# Patient Record
Sex: MALE | Race: WHITE | NOT HISPANIC OR LATINO | Employment: FULL TIME | URBAN - METROPOLITAN AREA
[De-identification: names, ages, dates, MRNs, and addresses within clinical notes are randomized per-mention and may not be internally consistent; named-entity substitution may affect disease eponyms.]

---

## 2017-04-04 ENCOUNTER — OFFICE VISIT - HEALTHEAST (OUTPATIENT)
Dept: FAMILY MEDICINE | Facility: CLINIC | Age: 42
End: 2017-04-04

## 2017-04-04 DIAGNOSIS — F31.9 BIPOLAR DISORDER (H): ICD-10-CM

## 2017-04-04 ASSESSMENT — MIFFLIN-ST. JEOR: SCORE: 1881.69

## 2017-04-05 ENCOUNTER — COMMUNICATION - HEALTHEAST (OUTPATIENT)
Dept: FAMILY MEDICINE | Facility: CLINIC | Age: 42
End: 2017-04-05

## 2018-02-12 ENCOUNTER — RECORDS - HEALTHEAST (OUTPATIENT)
Dept: ADMINISTRATIVE | Facility: OTHER | Age: 43
End: 2018-02-12

## 2018-02-12 ENCOUNTER — HOSPITAL ENCOUNTER (EMERGENCY)
Facility: CLINIC | Age: 43
Discharge: HOME | End: 2018-02-12
Attending: NURSE PRACTITIONER | Admitting: NURSE PRACTITIONER
Payer: COMMERCIAL

## 2018-02-12 ENCOUNTER — APPOINTMENT (OUTPATIENT)
Dept: GENERAL RADIOLOGY | Facility: CLINIC | Age: 43
End: 2018-02-12
Attending: NURSE PRACTITIONER
Payer: COMMERCIAL

## 2018-02-12 VITALS
SYSTOLIC BLOOD PRESSURE: 135 MMHG | TEMPERATURE: 98.4 F | WEIGHT: 180 LBS | HEART RATE: 42 BPM | RESPIRATION RATE: 18 BRPM | BODY MASS INDEX: 24.76 KG/M2 | OXYGEN SATURATION: 99 % | DIASTOLIC BLOOD PRESSURE: 84 MMHG

## 2018-02-12 DIAGNOSIS — S22.31XA CLOSED FRACTURE OF ONE RIB OF RIGHT SIDE, INITIAL ENCOUNTER: Primary | ICD-10-CM

## 2018-02-12 PROCEDURE — 99213 OFFICE O/P EST LOW 20 MIN: CPT | Performed by: NURSE PRACTITIONER

## 2018-02-12 PROCEDURE — G0463 HOSPITAL OUTPT CLINIC VISIT: HCPCS

## 2018-02-12 PROCEDURE — 71101 X-RAY EXAM UNILAT RIBS/CHEST: CPT | Mod: RT

## 2018-02-12 RX ORDER — HYDROCODONE BITARTRATE AND ACETAMINOPHEN 5; 325 MG/1; MG/1
1-2 TABLET ORAL EVERY 4 HOURS PRN
Qty: 15 TABLET | Refills: 0 | Status: SHIPPED | OUTPATIENT
Start: 2018-02-12 | End: 2020-01-03

## 2018-02-12 ASSESSMENT — ENCOUNTER SYMPTOMS
SINUS PAIN: 0
ACTIVITY CHANGE: 1
CONSTIPATION: 0
DIAPHORESIS: 0
HEADACHES: 0
SORE THROAT: 0
FEVER: 0
NAUSEA: 0
WHEEZING: 0
APPETITE CHANGE: 0
SHORTNESS OF BREATH: 0
WOUND: 1
ABDOMINAL PAIN: 0
EYE PAIN: 0
DIFFICULTY URINATING: 0
EYE REDNESS: 0
CHILLS: 0
SINUS PRESSURE: 0
VOMITING: 0
DYSURIA: 0
DIARRHEA: 0
FATIGUE: 0
COUGH: 1

## 2018-02-12 NOTE — ED PROVIDER NOTES
History     Chief Complaint   Patient presents with     Rib Injury     fell while skiing on friday. also hit head no LOC.      HPI  Law Guadalupe is a 42 year old male who fell downhill skiing this past Friday night.  Patient states he was skiing at 1200 and he is not certain of what he hit he thinks he just hit the snow and hit his right eye and right posterior mid to lower ribs extending over to his lateral side of his right side.  Patient states he may or may not have had a loss of consciousness he definitely was dazed.  He does not have any witnesses.  He states that after the event people stopped and asked him if he was okay.  Denies any chest pain, shortness of air, cough, or trouble breathing.  Patient states that he has been using 400 mg of ibuprofen every 4-6 hours and this seems to be helping manage his pain.  Patient describes the pain as stabbing and a 5-6 at rest and a 9-10 when coughing or breathing deeply.  Patient denies any previous rib fractures and denies any previous injuries to this area.  Patient denies any neurological changes including headache, dizziness, memory loss, speech difficulty, gait difficulty.    Problem List:    Patient Active Problem List    Diagnosis Date Noted     CARDIOVASCULAR SCREENING; LDL GOAL LESS THAN 160 06/26/2013     Priority: Medium        Past Medical History:    No past medical history on file.    Past Surgical History:    Past Surgical History:   Procedure Laterality Date     ARTHROSCOPY KNEE RT/LT  2000,2004,2005     RT and Lt knee     FRACTURE TX, ANKLE RT/LT  1999     RT ankle     HC REPAIR OF NASAL SEPTUM  2004     NECK SURGERY  2011    cervical vertebral fracture, C7       Family History:    Family History   Problem Relation Age of Onset     CANCER Mother      thyroid CA     HEART DISEASE Mother      factor 5     CANCER Paternal Grandfather      Cancer - colorectal No family hx of      Prostate Cancer No family hx of        Social History:  Marital Status:   Single [1]  Social History   Substance Use Topics     Smoking status: Never Smoker     Smokeless tobacco: Never Used     Alcohol use Yes      Comment: occ        Medications:      HYDROcodone-acetaminophen (NORCO) 5-325 MG per tablet   Naproxen Sodium (ALEVE PO)     Review of Systems   Constitutional: Positive for activity change. Negative for appetite change, chills, diaphoresis, fatigue and fever.   HENT: Negative for congestion, ear discharge, ear pain, sinus pain, sinus pressure, sneezing and sore throat.    Eyes: Negative for pain and redness.   Respiratory: Positive for cough (rare). Negative for shortness of breath and wheezing.    Cardiovascular: Negative for chest pain.   Gastrointestinal: Negative for abdominal pain, constipation, diarrhea, nausea and vomiting.   Genitourinary: Negative for difficulty urinating and dysuria.   Skin: Positive for wound (ribs posterior mid to lower). Negative for rash.   Neurological: Negative for headaches.   All other systems reviewed and are negative.      Physical Exam   BP: 135/84  Pulse: (!) 42  Temp: 98.4  F (36.9  C)  Resp: 18  Weight: 81.6 kg (180 lb)  SpO2: 99 %      Physical Exam   Constitutional: He appears well-developed and well-nourished. No distress.   HENT:   Head: Normocephalic and atraumatic.   Right Ear: External ear normal.   Left Ear: External ear normal.   Nose: Nose normal.   Mouth/Throat: Oropharynx is clear and moist. No oropharyngeal exudate.   Eyes: Conjunctivae and EOM are normal. Pupils are equal, round, and reactive to light. Right eye exhibits no discharge and no exudate. Left eye exhibits no discharge and no exudate.   Periorbital bruising,brown, yellow and green encompassing entire periorbital area without swelling   Neck: Neck supple. No JVD present. No tracheal deviation present.   Cardiovascular: Normal rate, regular rhythm and normal heart sounds.  Exam reveals no gallop and no friction rub.    No murmur heard.  Pulmonary/Chest: Effort  normal and breath sounds normal. No stridor. No respiratory distress. He has no wheezes. He has no rales. He exhibits no tenderness.   Musculoskeletal:        Arms:  Lymphadenopathy:     He has no cervical adenopathy.   Neurological: He is alert.   Skin: Skin is warm and dry. No rash noted. He is not diaphoretic.   Psychiatric: He has a normal mood and affect.   Nursing note and vitals reviewed.      ED Course     ED Course     Procedures    Labs Ordered and Resulted from Time of ED Arrival Up to the Time of Departure from the ED - No data to display  Results for orders placed or performed during the hospital encounter of 02/12/18   Ribs XR, unilat 3 views + PA chest, right    Narrative    RIBS AND CHEST RIGHT THREE VIEWS February 12, 2018 1:03 PM     HISTORY: Posterior mid to lower ribs - fell skiing Friday night.     COMPARISON: Chest 12/22/2015.    FINDINGS: Postsurgical change again seen at the right shoulder and  lower cervical spine. Old right clavicle fracture.      Impression    IMPRESSION: Minimal right pleural effusion. Mildly displaced fracture  of the right 10th rib posteriorly. No pneumothorax.    LAUREN WILKERSON MD       Assessments & Plan (with Medical Decision Making)     I have reviewed the nursing notes.    I have reviewed the findings, diagnosis, plan and need for follow up with the patient.  Law Guadalupe is a 42 year old male who fell downhill skiing this past Friday night.  Patient states he was skiing at 1200 and he is not certain of what he hit he thinks he just hit the snow and hit his right eye and right posterior mid to lower ribs extending over to his lateral side of his right side.  Patient states he may or may not have had a loss of consciousness he definitely was dazed.  He does not have any witnesses.  He states that after the event people stopped and asked him if he was okay.  Denies any chest pain, shortness of air, cough, or trouble breathing.  Patient states that he has been using 400  mg of ibuprofen every 4-6 hours and this seems to be helping manage his pain.  Patient describes the pain as stabbing and a 5-6 at rest and a 9-10 when coughing or breathing deeply.  Patient denies any previous rib fractures and denies any previous injuries to this area.  Patient denies any neurological changes including headache, dizziness, memory loss, speech difficulty, gait difficulty.  Exam as noted above.  Xray reveals rib fracture of 10th rib.  Discussed findings and care for rib fracture.  Patient okay with using ibuprofen 400 mg every 4-6 hours during the day and then will use Vicodin as needed as needed for sleep at night.  Recommend follow-up if ongoing pain    Discharge Medication List as of 2/12/2018  1:23 PM          Final diagnoses:   Closed fracture of one rib of right side, initial encounter - 10th rib       2/12/2018   [facility] EMERGENCY DEPARTMENT     Opal Junior, DIONY CNP  02/12/18 3382

## 2018-02-12 NOTE — DISCHARGE INSTRUCTIONS
Use ibuprofen 400-600 mg up to 4 times per day if needed for pain. Stop if it is causing nausea or abdominal pain.   Add Norco 5-325 (hydrocodone-acetaminophen) 1-2 pills up to every 4 hours if needed for pain. Do not use alcohol, operate machinery, drive, or climb on ladders for 8 hours after taking Norco. Use docusate (100mg) 2 times a day to prevent constipation while on narcotics.    Rib Fracture (Broken Rib)  Your ribs are curved bones in your chest. They help protect your lungs and expand and contract when you breathe. Children's ribs bend easily and can often withstand a blow or fall. But adult ribs are more likely to break (fracture) under stress. Even coughing or a hard sneeze can fracture a rib.    When to go to the Emergency Room (ER)  Although they can be painful, most rib fractures aren't serious. But they often make it hard to cough or breathe deeply. Get medical care right away if you have:    Trouble breathing.    Nausea, vomiting, or stomach pain with a sore or bruised rib.    Pain that worsens over time.    An injury to the chest or stomach.  What to expect in the ER  Here is what will happen in the ER:     A healthcare provider will ask about your injury and examine you carefully.    An X-ray of your chest will likely be taken to show any major damage to ribs and lungs. However, ribs can undergo small breaks that do not show up on X-rays, even though they still hurt.    You may be given medicine to ease your discomfort.    Rarely, rib fractures can cause a lung to collapse or lead to bleeding in the chest. In these cases, a tube will be inserted into the chest to reinflate the lung or drain the blood.  Follow-up  You are likely to heal in 6 to 8 weeks. Most rib fractures heal on their own with no lasting effects. Call your healthcare provider right away if you notice any of these symptoms:    Increased chest pain    Shortness of breath    Fever    Coughing up blood  Date Last Reviewed:  9/26/2015 2000-2017 [employer]. [address] [place] [address], [place] [address]. All rights reserved. This information is not intended as a substitute for professional medical care. Always follow your healthcare professional's instructions.

## 2018-02-12 NOTE — ED AVS SNAPSHOT
[facility] Emergency Department    [address] [facility] [address] [facility] [address]    Phone:  [phone]    Fax:  [phone]                                       Law Guadalupe   MRN: [id number]    Department:  [facility] Emergency Department   Date of Visit:  2/12/2018           After Visit Summary Signature Page     I have received my discharge instructions, and my questions have been answered. I have discussed any challenges I see with this plan with the nurse or doctor.    ..........................................................................................................................................  Patient/Patient Representative Signature      ..........................................................................................................................................  Patient Representative Print Name and Relationship to Patient    ..................................................               ................................................  Date                                            Time    ..........................................................................................................................................  Reviewed by Signature/Title    ...................................................              ..............................................  Date                                                            Time

## 2018-02-12 NOTE — ED AVS SNAPSHOT
[facility] Emergency Department    [address] [facility] [address]    Phone:  [phone]    Fax:  [phone]                                       Law Guadalupe   MRN: [id number]    Department:  [facility] Emergency Department   Date of Visit:  2/12/2018           Patient Information     Date Of Birth          1975        Your diagnoses for this visit were:     Closed fracture of one rib of right side, initial encounter 10th rib       You were seen by Opal Junior APRN CNP.      Follow-up Information     Follow up with Clinic, [facility].    Why:  As needed, If symptoms worsen    Contact information:    [address] [facility] [id number] [phone]          Discharge Instructions         Use ibuprofen 400-600 mg up to 4 times per day if needed for pain. Stop if it is causing nausea or abdominal pain.   Add Norco 5-325 (hydrocodone-acetaminophen) 1-2 pills up to every 4 hours if needed for pain. Do not use alcohol, operate machinery, drive, or climb on ladders for 8 hours after taking Norco. Use docusate (100mg) 2 times a day to prevent constipation while on narcotics.    Rib Fracture (Broken Rib)  Your ribs are curved bones in your chest. They help protect your lungs and expand and contract when you breathe. Children's ribs bend easily and can often withstand a blow or fall. But adult ribs are more likely to break (fracture) under stress. Even coughing or a hard sneeze can fracture a rib.    When to go to the Emergency Room (ER)  Although they can be painful, most rib fractures aren't serious. But they often make it hard to cough or breathe deeply. Get medical care right away if you have:    Trouble breathing.    Nausea, vomiting, or stomach pain with a sore or bruised rib.    Pain that worsens over time.    An injury to the chest or stomach.  What to expect in the ER  Here is what will happen in the ER:     A healthcare provider will ask about your injury and examine  you carefully.    An X-ray of your chest will likely be taken to show any major damage to ribs and lungs. However, ribs can undergo small breaks that do not show up on X-rays, even though they still hurt.    You may be given medicine to ease your discomfort.    Rarely, rib fractures can cause a lung to collapse or lead to bleeding in the chest. In these cases, a tube will be inserted into the chest to reinflate the lung or drain the blood.  Follow-up  You are likely to heal in 6 to 8 weeks. Most rib fractures heal on their own with no lasting effects. Call your healthcare provider right away if you notice any of these symptoms:    Increased chest pain    Shortness of breath    Fever    Coughing up blood  Date Last Reviewed: 9/26/2015 2000-2017 [employer]. [address] [place] [address], [place] [address]. All rights reserved. This information is not intended as a substitute for professional medical care. Always follow your healthcare professional's instructions.          24 Hour Appointment Hotline       To make an appointment at any [facility], call [phone] ([phone]). If you don't have a family doctor or clinic, we will help you find one. [facility] clinics are conveniently located to serve the needs of you and your family.             Review of your medicines      Our records show that you are taking the medicines listed below. If these are incorrect, please call your family doctor or clinic.        Dose / Directions Last dose taken    ALEVE PO   Dose:  220 mg        Take 220 mg by mouth 2 times daily (with meals)   Refills:  0        HYDROcodone-acetaminophen 5-325 MG per tablet   Commonly known as:  NORCO   Dose:  1-2 tablet   Quantity:  15 tablet        Take 1-2 tablets by mouth every 4 hours as needed for moderate to severe pain   Refills:  0                Prescriptions were sent or printed at these locations (1 Prescription)                   Other Prescriptions                 Printed at Department/Unit printer (1 of 1)         HYDROcodone-acetaminophen (NORCO) 5-325 MG per tablet                Procedures and tests performed during your visit     Ribs XR, unilat 3 views + PA chest, right      Orders Needing Specimen Collection     None      Pending Results     Date and Time Order Name Status Description    2/12/2018 1243 Ribs XR, unilat 3 views + PA chest, right Preliminary             Pending Culture Results     No orders found from 2/10/2018 to 2/13/2018.            Pending Results Instructions     If you had any lab results that were not finalized at the time of your Discharge, you can call the ED Lab Result RN at [phone]. You will be contacted by this team for any positive Lab results or changes in treatment. The nurses are available 7 days a week from 10A to 6:30P.  You can leave a message 24 hours per day and they will return your call.        Test Results From Your Hospital Stay        2/12/2018  1:11 PM      Narrative     RIBS AND CHEST RIGHT THREE VIEWS February 12, 2018 1:03 PM     HISTORY: Posterior mid to lower ribs - fell skiing Friday night.     COMPARISON: Chest 12/22/2015.    FINDINGS: Postsurgical change again seen at the right shoulder and  lower cervical spine. Old right clavicle fracture.        Impression     IMPRESSION: Minimal right pleural effusion. Mildly displaced fracture  of the right 10th rib posteriorly. No pneumothorax.                Thank you for choosing [facility]       Thank you for choosing [facility] for your care. Our goal is always to provide you with excellent care. Hearing back from our patients is one way we can continue to improve our services. Please take a few minutes to complete the written survey that you may receive in the mail after you visit with us. Thank you!        [facility] Information     [employer] [facility] lets you send messages to your doctor, view your test results, renew your prescriptions, schedule appointments and more. To sign up, go to  [email or web address] . Click on \"Log in\" on the left side of the screen, which will take you to the Welcome page. Then click on \"Sign up Now\" on the right side of the page.     You will be asked to enter the access code listed below, as well as some personal information. Please follow the directions to create your username and password.     Your access code is: [email or web address]  Expires: 2018  1:23 PM     Your access code will  in 90 days. If you need help or a new code, please call your [place] clinic or [phone].        Care EveryWhere ID     This is your Care EveryWhere ID. This could be used by other organizations to access your [place] medical records  [id number]        Equal Access to Services     SUKHJINDER GONZALEZ : Felicia Yepez, mari obrien, heike roach, raudel rice. So [facility] [phone].    ATENCIÓN: Si habla español, tiene a wagner disposición servicios gratuitos de asistencia lingüística. Llame al [phone].    We comply with applicable federal civil rights laws and [place] laws. We do not discriminate on the basis of race, color, national origin, age, disability, sex, sexual orientation, or gender identity.            After Visit Summary       This is your record. Keep this with you and show to your community pharmacist(s) and doctor(s) at your next visit.                  "

## 2018-04-06 ENCOUNTER — COMMUNICATION - HEALTHEAST (OUTPATIENT)
Dept: FAMILY MEDICINE | Facility: CLINIC | Age: 43
End: 2018-04-06

## 2018-04-09 ENCOUNTER — OFFICE VISIT - HEALTHEAST (OUTPATIENT)
Dept: FAMILY MEDICINE | Facility: CLINIC | Age: 43
End: 2018-04-09

## 2018-04-09 DIAGNOSIS — R22.0 SCALP MASS: ICD-10-CM

## 2018-04-09 DIAGNOSIS — Z01.818 PREOPERATIVE EXAMINATION: ICD-10-CM

## 2018-04-09 DIAGNOSIS — M51.26 LUMBAR DISC HERNIATION: ICD-10-CM

## 2018-04-09 LAB — HGB BLD-MCNC: 16.1 G/DL (ref 14–18)

## 2018-04-09 ASSESSMENT — MIFFLIN-ST. JEOR: SCORE: 1899.83

## 2018-04-10 ENCOUNTER — OFFICE VISIT - HEALTHEAST (OUTPATIENT)
Dept: SURGERY | Facility: CLINIC | Age: 43
End: 2018-04-10

## 2018-04-10 DIAGNOSIS — D17.0 LIPOMA OF SCALP: ICD-10-CM

## 2018-04-10 ASSESSMENT — MIFFLIN-ST. JEOR: SCORE: 1899.83

## 2018-04-13 ENCOUNTER — ANESTHESIA - HEALTHEAST (OUTPATIENT)
Dept: SURGERY | Facility: AMBULATORY SURGERY CENTER | Age: 43
End: 2018-04-13

## 2018-04-13 ASSESSMENT — MIFFLIN-ST. JEOR: SCORE: 1907.77

## 2018-04-16 ENCOUNTER — SURGERY - HEALTHEAST (OUTPATIENT)
Dept: SURGERY | Facility: AMBULATORY SURGERY CENTER | Age: 43
End: 2018-04-16

## 2018-04-19 ENCOUNTER — COMMUNICATION - HEALTHEAST (OUTPATIENT)
Dept: SURGERY | Facility: CLINIC | Age: 43
End: 2018-04-19

## 2019-05-03 ENCOUNTER — COMMUNICATION - HEALTHEAST (OUTPATIENT)
Dept: FAMILY MEDICINE | Facility: CLINIC | Age: 44
End: 2019-05-03

## 2019-05-03 ENCOUNTER — OFFICE VISIT - HEALTHEAST (OUTPATIENT)
Dept: FAMILY MEDICINE | Facility: CLINIC | Age: 44
End: 2019-05-03

## 2019-05-03 DIAGNOSIS — Z23 IMMUNIZATION DUE: ICD-10-CM

## 2019-05-03 DIAGNOSIS — S83.207A TEAR OF MENISCUS OF LEFT KNEE AS CURRENT INJURY, UNSPECIFIED MENISCUS, UNSPECIFIED TEAR TYPE, INITIAL ENCOUNTER: ICD-10-CM

## 2019-05-03 DIAGNOSIS — Z01.818 ENCOUNTER FOR PREOPERATIVE EXAMINATION FOR GENERAL SURGICAL PROCEDURE: ICD-10-CM

## 2019-05-03 DIAGNOSIS — S83.512A RUPTURE OF ANTERIOR CRUCIATE LIGAMENT OF LEFT KNEE, INITIAL ENCOUNTER: ICD-10-CM

## 2019-05-03 LAB — HGB BLD-MCNC: 16.2 G/DL (ref 14–18)

## 2019-05-03 ASSESSMENT — MIFFLIN-ST. JEOR: SCORE: 1868.36

## 2020-01-03 ENCOUNTER — RECORDS - HEALTHEAST (OUTPATIENT)
Dept: ADMINISTRATIVE | Facility: OTHER | Age: 45
End: 2020-01-03

## 2020-01-03 ENCOUNTER — HOSPITAL ENCOUNTER (EMERGENCY)
Facility: CLINIC | Age: 45
Discharge: HOME | End: 2020-01-03
Attending: PHYSICIAN ASSISTANT | Admitting: PHYSICIAN ASSISTANT
Payer: COMMERCIAL

## 2020-01-03 VITALS
WEIGHT: 200 LBS | DIASTOLIC BLOOD PRESSURE: 88 MMHG | TEMPERATURE: 98 F | BODY MASS INDEX: 27.09 KG/M2 | OXYGEN SATURATION: 99 % | HEIGHT: 72 IN | SYSTOLIC BLOOD PRESSURE: 127 MMHG | HEART RATE: 66 BPM

## 2020-01-03 DIAGNOSIS — Z20.828 EXPOSURE TO INFLUENZA: ICD-10-CM

## 2020-01-03 DIAGNOSIS — J11.1 INFLUENZA-LIKE ILLNESS: ICD-10-CM

## 2020-01-03 LAB
FLUAV AG UPPER RESP QL IA.RAPID: NEGATIVE
FLUBV AG UPPER RESP QL IA.RAPID: NEGATIVE
INTERNAL QC OK POCT: YES
INTERNAL QC OK POCT: YES
S PYO AG THROAT QL IA.RAPID: NEGATIVE

## 2020-01-03 PROCEDURE — 87880 STREP A ASSAY W/OPTIC: CPT | Performed by: PHYSICIAN ASSISTANT

## 2020-01-03 PROCEDURE — 87804 INFLUENZA ASSAY W/OPTIC: CPT | Performed by: PHYSICIAN ASSISTANT

## 2020-01-03 PROCEDURE — 99214 OFFICE O/P EST MOD 30 MIN: CPT | Mod: Z6 | Performed by: PHYSICIAN ASSISTANT

## 2020-01-03 PROCEDURE — 87081 CULTURE SCREEN ONLY: CPT | Performed by: PHYSICIAN ASSISTANT

## 2020-01-03 PROCEDURE — G0463 HOSPITAL OUTPT CLINIC VISIT: HCPCS | Performed by: PHYSICIAN ASSISTANT

## 2020-01-03 RX ORDER — OSELTAMIVIR PHOSPHATE 75 MG/1
75 CAPSULE ORAL 2 TIMES DAILY
Qty: 10 CAPSULE | Refills: 0 | Status: SHIPPED | OUTPATIENT
Start: 2020-01-03 | End: 2020-01-08

## 2020-01-03 ASSESSMENT — ENCOUNTER SYMPTOMS
HEADACHES: 1
CHILLS: 1
ARTHRALGIAS: 1
COUGH: 1
SORE THROAT: 1
FEVER: 1

## 2020-01-03 ASSESSMENT — MIFFLIN-ST. JEOR: SCORE: 1840.19

## 2020-01-03 NOTE — ED AVS SNAPSHOT
[facility] Emergency Department  [address] [facility] [address]  Phone:  [phone]  Fax:  [phone]                                    Law Guadalupe   MRN: [id number]    Department:  [facility] Emergency Department   Date of Visit:  1/3/2020           After Visit Summary Signature Page    I have received my discharge instructions, and my questions have been answered. I have discussed any challenges I see with this plan with the nurse or doctor.    ..........................................................................................................................................  Patient/Patient Representative Signature      ..........................................................................................................................................  Patient Representative Print Name and Relationship to Patient    ..................................................               ................................................  Date                                   Time    ..........................................................................................................................................  Reviewed by Signature/Title    ...................................................              ..............................................  Date                                               Time          22EPIC Rev 08/18

## 2020-01-03 NOTE — ED PROVIDER NOTES
History     Chief Complaint   Patient presents with     Cough     HPI  Law Guadalupe is a 44 year old male who presents with complaints of sudden onset of subjective fevers, headache, body aches, congestion, sore throat, and cough for the past 2 days.  Patient is concerned as he has been exposed to influenza from his father and mother.  Denies rash, nausea, vomiting, abdominal pain, chest pain, or shortness of breath.  Patient denies history of asthma or underlying lung disease.      Allergies:  Allergies   Allergen Reactions     Bees        Problem List:    Patient Active Problem List    Diagnosis Date Noted     CARDIOVASCULAR SCREENING; LDL GOAL LESS THAN 160 06/26/2013     Priority: Medium        Past Medical History:    History reviewed. No pertinent past medical history.    Past Surgical History:    Past Surgical History:   Procedure Laterality Date     ARTHROSCOPY KNEE RT/LT  2000,2004,2005     RT and Lt knee     FRACTURE TX, ANKLE RT/LT  1999     RT ankle     HC REPAIR OF NASAL SEPTUM  2004     NECK SURGERY  2011    cervical vertebral fracture, C7       Family History:    Family History   Problem Relation Age of Onset     Cancer Mother         thyroid CA     Heart Disease Mother         factor 5     Cancer Paternal Grandfather      Cancer - colorectal No family hx of      Prostate Cancer No family hx of        Social History:  Marital Status:  Single [1]  Social History     Tobacco Use     Smoking status: Never Smoker     Smokeless tobacco: Never Used   Substance Use Topics     Alcohol use: Yes     Comment: occ     Drug use: No        Medications:    oseltamivir (TAMIFLU) 75 MG capsule  Naproxen Sodium (ALEVE PO)          Review of Systems   Constitutional: Positive for chills and fever.   HENT: Positive for congestion and sore throat.    Respiratory: Positive for cough.    Musculoskeletal: Positive for arthralgias.   Skin: Negative.    Neurological: Positive for headaches.   All other systems reviewed and  are negative.      Physical Exam   BP: 127/88  Pulse: 66  Temp: 98  F (36.7  C)  Height: 182.9 cm (6')  Weight: 90.7 kg (200 lb)  SpO2: 99 %      Physical Exam  Constitutional:       General: He is not in acute distress.     Appearance: He is well-developed. He is not ill-appearing, toxic-appearing or diaphoretic.   HENT:      Head: Normocephalic and atraumatic.      Right Ear: Tympanic membrane, ear canal and external ear normal.      Left Ear: Tympanic membrane, ear canal and external ear normal.      Nose: Mucosal edema, congestion and rhinorrhea present.      Mouth/Throat:      Lips: Pink.      Mouth: Mucous membranes are moist.      Pharynx: Uvula midline. Posterior oropharyngeal erythema present. No pharyngeal swelling, oropharyngeal exudate or uvula swelling.      Tonsils: No tonsillar exudate or tonsillar abscesses.   Eyes:      Conjunctiva/sclera: Conjunctivae normal.      Pupils: Pupils are equal, round, and reactive to light.   Neck:      Musculoskeletal: Full passive range of motion without pain, normal range of motion and neck supple. Normal range of motion. No neck rigidity.   Cardiovascular:      Rate and Rhythm: Normal rate and regular rhythm.      Heart sounds: Normal heart sounds.   Pulmonary:      Effort: Pulmonary effort is normal. No respiratory distress.      Breath sounds: Normal breath sounds and air entry. No stridor, decreased air movement or transmitted upper airway sounds. No decreased breath sounds, wheezing, rhonchi or rales.   Lymphadenopathy:      Cervical: No cervical adenopathy.   Skin:     General: Skin is warm and dry.   Neurological:      Mental Status: He is alert and oriented to person, place, and time.         ED Course        Procedures    Results for orders placed or performed during the hospital encounter of 01/03/20 (from the past 24 hour(s))   Influenza A/B antigen POCT   Result Value Ref Range    Influenza A Negative neg    Influenza B Negative neg    Internal QC OK Yes     Rapid strep group A screen POCT   Result Value Ref Range    Rapid Strep A Screen Negative neg    Internal QC OK Yes    Beta strep group A culture   Result Value Ref Range    Specimen Description Throat     Special Requests Specimen collected in eSwab transport (white cap)     Culture Micro PENDING        Medications - No data to display    Assessments & Plan (with Medical Decision Making)     Pt is a 44 year old male who presents with complaints of sudden onset of subjective fevers, headache, body aches, congestion, sore throat, and cough for the past 2 days.  Patient is concerned as he has been exposed to influenza from his father and mother.  Pt is afebrile on arrival.  Exam as above.  Rapid strep was negative.  Culture is pending.  Influenza was negative.  Despite patient's negative influenza testing here, I suspect patient likely has influenza.  Influenza is prevalent in the community.  Discussed treatment options with patient.  Patient is otherwise healthy.  We discussed risks versus benefits of treating with Tamiflu.  He is presenting within the recommended window for treatment and had a close exposure at home.  Patient prefers to treat with Tamiflu.  Encouraged additional symptomatic treatments at home.  Return precautions were reviewed.  Hand-outs were provided.    Patient was sent with Tamiflu and was instructed to follow-up with PCP if no improvement in 5-7 days for continued care and management or sooner if new or worsening symptoms.  He is to return to the ED for persistent and/or worsening symptoms.  Patient expressed understanding of the diagnosis and plan and was discharged home in good condition.    I have reviewed the nursing notes.    I have reviewed the findings, diagnosis, plan and need for follow up with the patient.    Discharge Medication List as of 1/3/2020  2:04 PM      START taking these medications    Details   oseltamivir (TAMIFLU) 75 MG capsule Take 1 capsule (75 mg) by mouth 2 times  daily for 5 days, Disp-10 capsule, R-0, E-Prescribe             Final diagnoses:   Influenza-like illness   Exposure to influenza       1/3/2020   [facility] EMERGENCY DEPARTMENT      Disclaimer:  This note consists of symbols derived from keyboarding, dictation and/or voice recognition software.  As a result, there may be errors in the script that have gone undetected.  Please consider this when interpreting information found in this chart.     Anais Lucero PA-C  01/03/20 2055

## 2020-01-05 LAB
BACTERIA SPEC CULT: NORMAL
Lab: NORMAL
SPECIMEN SOURCE: NORMAL

## 2020-06-30 ENCOUNTER — APPOINTMENT (OUTPATIENT)
Dept: GENERAL RADIOLOGY | Facility: CLINIC | Age: 45
End: 2020-06-30
Attending: PHYSICIAN ASSISTANT
Payer: COMMERCIAL

## 2020-06-30 ENCOUNTER — HOSPITAL ENCOUNTER (EMERGENCY)
Facility: CLINIC | Age: 45
Discharge: HOME | End: 2020-06-30
Attending: PHYSICIAN ASSISTANT | Admitting: PHYSICIAN ASSISTANT
Payer: COMMERCIAL

## 2020-06-30 ENCOUNTER — RECORDS - HEALTHEAST (OUTPATIENT)
Dept: ADMINISTRATIVE | Facility: OTHER | Age: 45
End: 2020-06-30

## 2020-06-30 VITALS
DIASTOLIC BLOOD PRESSURE: 83 MMHG | SYSTOLIC BLOOD PRESSURE: 128 MMHG | HEART RATE: 50 BPM | OXYGEN SATURATION: 98 % | TEMPERATURE: 98.8 F | WEIGHT: 190 LBS | RESPIRATION RATE: 18 BRPM | BODY MASS INDEX: 25.77 KG/M2

## 2020-06-30 DIAGNOSIS — R06.2 WHEEZING: ICD-10-CM

## 2020-06-30 DIAGNOSIS — R06.02 SHORTNESS OF BREATH: ICD-10-CM

## 2020-06-30 DIAGNOSIS — R05.9 COUGH: ICD-10-CM

## 2020-06-30 LAB
ALBUMIN SERPL-MCNC: 3.7 G/DL (ref 3.4–5)
ALP SERPL-CCNC: 66 U/L (ref 40–150)
ALT SERPL W P-5'-P-CCNC: 29 U/L (ref 0–70)
ANION GAP SERPL CALCULATED.3IONS-SCNC: 6 MMOL/L (ref 3–14)
AST SERPL W P-5'-P-CCNC: 29 U/L (ref 0–45)
BASE EXCESS BLDV CALC-SCNC: 1.3 MMOL/L
BASOPHILS # BLD AUTO: 0 10E9/L (ref 0–0.2)
BASOPHILS NFR BLD AUTO: 0.4 %
BILIRUB SERPL-MCNC: 1 MG/DL (ref 0.2–1.3)
BUN SERPL-MCNC: 21 MG/DL (ref 7–30)
CALCIUM SERPL-MCNC: 9 MG/DL (ref 8.5–10.1)
CHLORIDE SERPL-SCNC: 106 MMOL/L (ref 94–109)
CO2 SERPL-SCNC: 27 MMOL/L (ref 20–32)
CREAT SERPL-MCNC: 0.84 MG/DL (ref 0.66–1.25)
D DIMER PPP FEU-MCNC: <0.3 UG/ML FEU (ref 0–0.5)
DIFFERENTIAL METHOD BLD: NORMAL
EOSINOPHIL # BLD AUTO: 0.1 10E9/L (ref 0–0.7)
EOSINOPHIL NFR BLD AUTO: 1.5 %
ERYTHROCYTE [DISTWIDTH] IN BLOOD BY AUTOMATED COUNT: 11.5 % (ref 10–15)
GFR SERPL CREATININE-BSD FRML MDRD: >90 ML/MIN/{1.73_M2}
GLUCOSE SERPL-MCNC: 85 MG/DL (ref 70–99)
HCO3 BLDV-SCNC: 27 MMOL/L (ref 21–28)
HCT VFR BLD AUTO: 43.8 % (ref 40–53)
HGB BLD-MCNC: 14.7 G/DL (ref 13.3–17.7)
IMM GRANULOCYTES # BLD: 0 10E9/L (ref 0–0.4)
IMM GRANULOCYTES NFR BLD: 0.4 %
LYMPHOCYTES # BLD AUTO: 1.3 10E9/L (ref 0.8–5.3)
LYMPHOCYTES NFR BLD AUTO: 24.1 %
MCH RBC QN AUTO: 30.7 PG (ref 26.5–33)
MCHC RBC AUTO-ENTMCNC: 33.6 G/DL (ref 31.5–36.5)
MCV RBC AUTO: 91 FL (ref 78–100)
MONOCYTES # BLD AUTO: 0.5 10E9/L (ref 0–1.3)
MONOCYTES NFR BLD AUTO: 9.1 %
NEUTROPHILS # BLD AUTO: 3.4 10E9/L (ref 1.6–8.3)
NEUTROPHILS NFR BLD AUTO: 64.5 %
NRBC # BLD AUTO: 0 10*3/UL
NRBC BLD AUTO-RTO: 0 /100
O2/TOTAL GAS SETTING VFR VENT: NORMAL %
PCO2 BLDV: 47 MM HG (ref 40–50)
PH BLDV: 7.37 PH (ref 7.32–7.43)
PLATELET # BLD AUTO: 224 10E9/L (ref 150–450)
PO2 BLDV: 31 MM HG (ref 25–47)
POTASSIUM SERPL-SCNC: 4.6 MMOL/L (ref 3.4–5.3)
PROT SERPL-MCNC: 7.2 G/DL (ref 6.8–8.8)
RBC # BLD AUTO: 4.79 10E12/L (ref 4.4–5.9)
SODIUM SERPL-SCNC: 139 MMOL/L (ref 133–144)
TROPONIN I SERPL-MCNC: <0.015 UG/L (ref 0–0.04)
WBC # BLD AUTO: 5.3 10E9/L (ref 4–11)

## 2020-06-30 PROCEDURE — 84484 ASSAY OF TROPONIN QUANT: CPT | Performed by: PHYSICIAN ASSISTANT

## 2020-06-30 PROCEDURE — 93005 ELECTROCARDIOGRAM TRACING: CPT | Performed by: PHYSICIAN ASSISTANT

## 2020-06-30 PROCEDURE — 99285 EMERGENCY DEPT VISIT HI MDM: CPT | Mod: 25 | Performed by: PHYSICIAN ASSISTANT

## 2020-06-30 PROCEDURE — 71045 X-RAY EXAM CHEST 1 VIEW: CPT

## 2020-06-30 PROCEDURE — U0003 INFECTIOUS AGENT DETECTION BY NUCLEIC ACID (DNA OR RNA); SEVERE ACUTE RESPIRATORY SYNDROME CORONAVIRUS 2 (SARS-COV-2) (CORONAVIRUS DISEASE [COVID-19]), AMPLIFIED PROBE TECHNIQUE, MAKING USE OF HIGH THROUGHPUT TECHNOLOGIES AS DESCRIBED BY CMS-2020-01-R: HCPCS | Performed by: PHYSICIAN ASSISTANT

## 2020-06-30 PROCEDURE — 82803 BLOOD GASES ANY COMBINATION: CPT | Performed by: PHYSICIAN ASSISTANT

## 2020-06-30 PROCEDURE — 80053 COMPREHEN METABOLIC PANEL: CPT | Performed by: PHYSICIAN ASSISTANT

## 2020-06-30 PROCEDURE — 85379 FIBRIN DEGRADATION QUANT: CPT | Performed by: PHYSICIAN ASSISTANT

## 2020-06-30 PROCEDURE — C9803 HOPD COVID-19 SPEC COLLECT: HCPCS | Performed by: PHYSICIAN ASSISTANT

## 2020-06-30 PROCEDURE — 85025 COMPLETE CBC W/AUTO DIFF WBC: CPT | Performed by: PHYSICIAN ASSISTANT

## 2020-06-30 PROCEDURE — 93010 ELECTROCARDIOGRAM REPORT: CPT | Mod: Z6 | Performed by: PHYSICIAN ASSISTANT

## 2020-06-30 RX ORDER — ALBUTEROL SULFATE 90 UG/1
2 AEROSOL, METERED RESPIRATORY (INHALATION) EVERY 4 HOURS PRN
Qty: 1 INHALER | Refills: 0 | Status: SHIPPED | OUTPATIENT
Start: 2020-06-30

## 2020-06-30 ASSESSMENT — ENCOUNTER SYMPTOMS
ARTHRALGIAS: 1
WHEEZING: 1
SHORTNESS OF BREATH: 1
MYALGIAS: 1
CARDIOVASCULAR NEGATIVE: 1
COUGH: 1
FEVER: 0
HEADACHES: 1
CHEST TIGHTNESS: 0
FATIGUE: 1

## 2020-06-30 NOTE — ED NOTES
"Pt was mowing lawn on Saturday and was in green mold for approximately 10 minutes.   Pt felt short of breath and fatigue 8-10 hours after.  Pt woke up the next day with pounding headache, sob, sore and stiffness.   Pt tried to exercise but was feeling like \"passing out\" and slept most of the day.   Pt laid around all day yesterday.   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

## 2020-06-30 NOTE — ED PROVIDER NOTES
"  History     Chief Complaint   Patient presents with     Toxic Inhalation     inhaled green mold dust on saturday, sunday felt lightheaded, SOA today while at work, cough     HPI  Law Guadalupe is a 44 year old male who presents with complaints of shortness of breath, fatigue, arthralgias and myalgias, headache, and cough for the past 3 days.  Patient states he was mowing the lawn this past weekend and drove his lawnmower through a bunch of grass and \"green mold.\"  He states he was breathing in the substance.  Patient states that the following day, he woke up feeling ill with a headache, shortness of breath, wheezing, cough, and generalized stiffness and soreness.  Pt is concerned that breathing in the mold could have triggered inflammation in his lungs.  That afternoon, he took a 5-hour nap and felt somewhat better when he woke up.  He states when he went to work today, he again developed shortness of breath and lightheadedness and felt like he was going to pass out.  Patient denies any medical problems.  He states he is very active and healthy.  He denies history of asthma or smoking history.  Patient denies any ill contacts and specifically denies any contact with anyone testing positive for COVID-19.  Patient denies chest pain at any point.  Denies fevers, chills, nausea, vomiting, diarrhea, abdominal pain, or leg pain/swelling.      Allergies:  Allergies   Allergen Reactions     Bees        Problem List:    Patient Active Problem List    Diagnosis Date Noted     CARDIOVASCULAR SCREENING; LDL GOAL LESS THAN 160 06/26/2013     Priority: Medium        Past Medical History:    No past medical history on file.    Past Surgical History:    Past Surgical History:   Procedure Laterality Date     ARTHROSCOPY KNEE RT/LT  2000,2004,2005     RT and Lt knee     FRACTURE TX, ANKLE RT/LT  1999     RT ankle     HC REPAIR OF NASAL SEPTUM  2004     NECK SURGERY  2011    cervical vertebral fracture, C7       Family History:  "   Family History   Problem Relation Age of Onset     Cancer Mother         thyroid CA     Heart Disease Mother         factor 5     Cancer Paternal Grandfather      Cancer - colorectal No family hx of      Prostate Cancer No family hx of        Social History:  Marital Status:  Single [1]  Social History     Tobacco Use     Smoking status: Never Smoker     Smokeless tobacco: Never Used   Substance Use Topics     Alcohol use: Yes     Comment: occ     Drug use: No        Medications:    albuterol (PROAIR HFA/PROVENTIL HFA/VENTOLIN HFA) 108 (90 Base) MCG/ACT inhaler  Naproxen Sodium (ALEVE PO)          Review of Systems   Constitutional: Positive for fatigue. Negative for fever.   Respiratory: Positive for cough, shortness of breath and wheezing. Negative for chest tightness.    Cardiovascular: Negative.  Negative for chest pain.   Musculoskeletal: Positive for arthralgias and myalgias.   Skin: Negative.    Neurological: Positive for headaches.   All other systems reviewed and are negative.      Physical Exam   BP: 114/68  Pulse: 51  Heart Rate: 58  Temp: 98.4  F (36.9  C)  Resp: 16  Weight: 86.2 kg (190 lb)  SpO2: 98 %      Physical Exam  Constitutional:       General: He is not in acute distress.     Appearance: He is well-developed. He is not ill-appearing, toxic-appearing or diaphoretic.   HENT:      Head: Normocephalic and atraumatic.      Nose: Nose normal. No congestion or rhinorrhea.      Mouth/Throat:      Lips: Pink.      Mouth: Mucous membranes are moist.      Pharynx: Oropharynx is clear. No oropharyngeal exudate or posterior oropharyngeal erythema.   Eyes:      Conjunctiva/sclera: Conjunctivae normal.      Pupils: Pupils are equal, round, and reactive to light.   Neck:      Musculoskeletal: Normal range of motion and neck supple. No neck rigidity.   Cardiovascular:      Rate and Rhythm: Normal rate and regular rhythm.      Heart sounds: Normal heart sounds.   Pulmonary:      Effort: Pulmonary effort is  normal. No respiratory distress.      Breath sounds: Normal breath sounds. No stridor. No wheezing, rhonchi or rales.   Abdominal:      Palpations: Abdomen is soft.      Tenderness: There is no abdominal tenderness.   Musculoskeletal: Normal range of motion.   Lymphadenopathy:      Cervical: No cervical adenopathy.   Skin:     General: Skin is warm and dry.   Neurological:      Mental Status: He is alert and oriented to person, place, and time.         ED Course        Procedures               EKG Interpretation:      Interpreted by Anais Lucero PA-C and Dr. Menchaca  Symptoms at time of EKG: None   Rhythm: Sinus bradycardia  Rate: Bradycardia (52)  Axis: Normal  Ectopy: None  Conduction: Right bundle branch block  ST Segments/ T Waves: Non-specific ST-T wave changes  Q Waves: None  Comparison to prior: No old EKG available    Clinical Impression: Right bundle branch block      Results for orders placed or performed during the hospital encounter of 06/30/20 (from the past 24 hour(s))   CBC with platelets differential   Result Value Ref Range    WBC 5.3 4.0 - 11.0 10e9/L    RBC Count 4.79 4.4 - 5.9 10e12/L    Hemoglobin 14.7 13.3 - 17.7 g/dL    Hematocrit 43.8 40.0 - 53.0 %    MCV 91 78 - 100 fl    MCH 30.7 26.5 - 33.0 pg    MCHC 33.6 31.5 - 36.5 g/dL    RDW 11.5 10.0 - 15.0 %    Platelet Count 224 150 - 450 10e9/L    Diff Method Automated Method     % Neutrophils 64.5 %    % Lymphocytes 24.1 %    % Monocytes 9.1 %    % Eosinophils 1.5 %    % Basophils 0.4 %    % Immature Granulocytes 0.4 %    Nucleated RBCs 0 0 /100    Absolute Neutrophil 3.4 1.6 - 8.3 10e9/L    Absolute Lymphocytes 1.3 0.8 - 5.3 10e9/L    Absolute Monocytes 0.5 0.0 - 1.3 10e9/L    Absolute Eosinophils 0.1 0.0 - 0.7 10e9/L    Absolute Basophils 0.0 0.0 - 0.2 10e9/L    Abs Immature Granulocytes 0.0 0 - 0.4 10e9/L    Absolute Nucleated RBC 0.0    Comprehensive metabolic panel   Result Value Ref Range    Sodium 139 133 - 144 mmol/L    Potassium 4.6  "3.4 - 5.3 mmol/L    Chloride 106 94 - 109 mmol/L    Carbon Dioxide 27 20 - 32 mmol/L    Anion Gap 6 3 - 14 mmol/L    Glucose 85 70 - 99 mg/dL    Urea Nitrogen 21 7 - 30 mg/dL    Creatinine 0.84 0.66 - 1.25 mg/dL    GFR Estimate >90 >60 mL/min/[1.73_m2]    GFR Estimate If Black >90 >60 mL/min/[1.73_m2]    Calcium 9.0 8.5 - 10.1 mg/dL    Bilirubin Total 1.0 0.2 - 1.3 mg/dL    Albumin 3.7 3.4 - 5.0 g/dL    Protein Total 7.2 6.8 - 8.8 g/dL    Alkaline Phosphatase 66 40 - 150 U/L    ALT 29 0 - 70 U/L    AST 29 0 - 45 U/L   Blood gas venous   Result Value Ref Range    Ph Venous 7.37 7.32 - 7.43 pH    PCO2 Venous 47 40 - 50 mm Hg    PO2 Venous 31 25 - 47 mm Hg    Bicarbonate Venous 27 21 - 28 mmol/L    Base Excess Venous 1.3 mmol/L    FIO2 ra    Troponin I   Result Value Ref Range    Troponin I ES <0.015 0.000 - 0.045 ug/L   D dimer quantitative   Result Value Ref Range    D Dimer <0.3 0.0 - 0.50 ug/ml FEU   XR Chest Port 1 View    Narrative    XR CHEST PORT 1 VW 6/30/2020 3:29 PM    HISTORY: Shortness of breath, cough    COMPARISON: 2/12/2018.    FINDINGS: Lungs are clear. No airspace consolidation, pneumothorax, or  pleural fluid. Heart size and pulmonary vasculature are normal  appearing. Postsurgical changes of the right shoulder. Remote, healed  right posterior 10th rib fracture. Remote, healed right clavicular  shaft fracture. No acute osseous abnormality.      Impression    IMPRESSION: No acute cardiopulmonary process.    CORINNE MORENO MD       Medications - No data to display      Assessments & Plan (with Medical Decision Making)     Pt is a 44 year old male who presents with complaints of shortness of breath, fatigue, arthralgias and myalgias, headache, and cough for the past 3 days.  Patient states he was mowing the lawn this past weekend and drove his lawnmower through a bunch of grass and \"green mold.\"  He states he was breathing in the substance.  Patient states that the following day, he woke up feeling " ill with a headache, shortness of breath, wheezing, cough, and generalized stiffness and soreness.  Pt is concerned that breathing in the mold could have triggered inflammation in his lungs.  That afternoon, he took a 5-hour nap and felt somewhat better when he woke up.  He states when he went to work today, he again developed shortness of breath and lightheadedness and felt like he was going to pass out.      Pt is afebrile on arrival.  Exam as above.  Pt is not hypoxic.  Lungs are clear to auscultation bilaterally on exam.  He appears in no respiratory distress.  EKG shows right bundle branch block.  No previous EKG to compare to.  Troponin is undetectable.  D-dimer is undetectable.  CBC and comprehensive metabolic panel are unremarkable.  Venous blood gases undetectable.  Chest x-ray was negative for pneumonia or acute pathology.  COVID-19 testing is pending.  Discussed results with patient.  Return precautions were reviewed.  Hand-outs were provided.    Patient was sent with Albuterol inhaler to use as needed for wheezing symptoms, and he was instructed to follow-up with PCP if no improvement in 3-5 days for continued care and management or sooner if new or worsening symptoms.  He is to return to the ED for persistent and/or worsening symptoms.  Patient expressed understanding of the diagnosis and plan and was discharged home in good condition.    I have reviewed the nursing notes.    I have reviewed the findings, diagnosis, plan and need for follow up with the patient.    Discharge Medication List as of 6/30/2020  4:10 PM      START taking these medications    Details   albuterol (PROAIR HFA/PROVENTIL HFA/VENTOLIN HFA) 108 (90 Base) MCG/ACT inhaler Inhale 2 puffs into the lungs every 4 hours as needed, Disp-1 Inhaler,R-0, E-PrescribePharmacy may dispense brand covered by insurance (Proair, or proventil or ventolin or generic albuterol inhaler)             Final diagnoses:   Cough   Shortness of breath    Wheezing       6/30/2020   [facility] EMERGENCY DEPARTMENT      Disclaimer:  This note consists of symbols derived from keyboarding, dictation and/or voice recognition software.  As a result, there may be errors in the script that have gone undetected.  Please consider this when interpreting information found in this chart.     Anias Lucero PA-C  07/01/20 2225

## 2020-06-30 NOTE — LETTER
July 2, 2020        Law Guadalupe  [address] [place] [address]    This letter provides a written record that you were tested for COVID-19 on 6/30/20.       Your result was negative. This means that we didn t find the virus that causes COVID-19 in your sample. A test may show negative when you do actually have the virus. This can happen when the virus is in the early stages of infection, before you feel illness symptoms.    If you have symptoms   Stay home and away from others (self-isolate) until you meet ALL of the guidelines below:    You ve had no fever--and no medicine that reduces fever--for 3 full days (72 hours). And      Your other symptoms have gotten better. For example, your cough or breathing has improved. And     At least 10 days have passed since your symptoms started.    During this time:    Stay home. Don t go to work, school or anywhere else.     Stay in your own room, including for meals. Use your own bathroom if you can.    Stay away from others in your home. No hugging, kissing or shaking hands. No visitors.    Clean  high touch  surfaces often (doorknobs, counters, handles, etc.). Use a household cleaning spray or wipes. You can find a full list on the EPA website at www.epa.gov/pesticide-registration/list-n-disinfectants-use-against-sars-cov-2.    Cover your mouth and nose with a mask, tissue or washcloth to avoid spreading germs.    Wash your hands and face often with soap and water.    Going back to work  Check with your employer for any guidelines to follow for going back to work.    Employers: This document serves as formal notice that your employee tested negative for COVID-19, as of the testing date shown above.

## 2020-07-01 LAB
SARS-COV-2 RNA SPEC QL NAA+PROBE: NOT DETECTED
SPECIMEN SOURCE: NORMAL

## 2020-08-17 ENCOUNTER — COMMUNICATION - HEALTHEAST (OUTPATIENT)
Dept: SCHEDULING | Facility: CLINIC | Age: 45
End: 2020-08-17

## 2020-08-21 ENCOUNTER — OFFICE VISIT - HEALTHEAST (OUTPATIENT)
Dept: FAMILY MEDICINE | Facility: CLINIC | Age: 45
End: 2020-08-21

## 2020-08-21 DIAGNOSIS — S46.011D TRAUMATIC TEAR OF RIGHT ROTATOR CUFF, UNSPECIFIED TEAR EXTENT, SUBSEQUENT ENCOUNTER: ICD-10-CM

## 2020-08-21 DIAGNOSIS — Z01.818 PREOPERATIVE EXAMINATION: ICD-10-CM

## 2020-08-21 ASSESSMENT — MIFFLIN-ST. JEOR: SCORE: 1845.97

## 2021-05-28 NOTE — PATIENT INSTRUCTIONS - HE
Hold all supplements, aspirin and NSAIDs for 7 days prior to surgery.  Follow your surgeon's direction on when to stop eating and drinking prior to surgery.  Your surgeon will be managing your pain after your surgery.

## 2021-05-28 NOTE — PROGRESS NOTES
Preoperative Exam    Scheduled Procedure:  ACL and meniscus repair, Left knee  Surgery Date:  5/7/2019  Surgery Location: [facility], fax [phone]    Surgeon:  Lea flores surgeons name    Assessment/Plan:     1. Encounter for preoperative examination for general surgical procedure  2. Rupture of anterior cruciate ligament of left knee, initial encounter  3. Tear of meniscus of left knee as current injury, unspecified meniscus, unspecified tear type, initial encounter  - Hemoglobin    4. Immunization due  - Tdap vaccine,  6yo or older,  IM     Surgical Procedure Risk: Intermediate (reported cardiac risk generally 1-5%)  Have you had prior anesthesia?: Yes  Have you or any family members had a previous anesthesia reaction:  No  Do you or any family members have a history of a clotting or bleeding disorder?: No  Cardiac Risk Assessment: no increased risk for major cardiac complications    Patient approved for surgery with general or local anesthesia.    Patient Instructions     Hold all supplements, aspirin and NSAIDs for 7 days prior to surgery.  Follow your surgeon's direction on when to stop eating and drinking prior to surgery.  Your surgeon will be managing your pain after your surgery.      Functional Status: Independent  Patient plans to recover at home with family.     Subjective:      Law Guadalupe is a 43 y.o. male who presents for a preoperative consultation.  Injured knee last Saturday running, is scheduled for surgery on 5/7/19, urgently due to meniscus injury.     Has tolerated surgery well in the past, no issues with anesthesia. No exertional chest pain. Is able to exercise without pain or shortness of breath. No recent illnesses.     All other systems reviewed and are negative, other than those listed in the HPI.    Pertinent History  Do you have difficulty breathing or chest pain after walking up a flight of stairs: No  History of obstructive sleep apnea:  No  Steroid use in the last 6 months: No  Frequent Aspirin/NSAID use: No  Prior Blood Transfusion: No  Prior Blood Transfusion Reaction: No  If for some reason prior to, during or after the procedure, if it is medically indicated, would you be willing to have a blood transfusion?:  There is no transfusion refusal.    Current Outpatient Medications   Medication Sig Dispense Refill     ibuprofen (ADVIL,MOTRIN) 200 MG tablet Take 200 mg by mouth every 6 (six) hours as needed for pain.       No current facility-administered medications for this visit.    Not taking ibuprofen in anticipation of surgery.      Allergies   Allergen Reactions     Venom-Honey Bee Anaphylaxis       Patient Active Problem List   Diagnosis     Bipolar Disorder     Lipoma of scalp       Past Medical History:   Diagnosis Date     Bipolar disorder (H)        Past Surgical History:   Procedure Laterality Date     ANKLE SURGERY Right      KNEE SURGERY       NECK SURGERY       [place] EXCISE EXCESS SKIN TISSUE,OTHER Right 4/16/2018    Procedure: EXCISION, LIPOMA of Scalp;  Surgeon: Tiffanie Sanz MD;  Location: [facility];  Service: General     SHOULDER SURGERY Right        Social History     Tobacco Use     Smoking status: Never Smoker     Smokeless tobacco: Never Used   Substance Use Topics     Alcohol use: Yes     Comment: Rare     Drug use: No       Patient Care Team:  Connor Cronin MD as PCP - General (Family Medicine)      Objective:     Vitals:    05/03/19 1341   BP: 128/82   Pulse: 64   Resp: 12   Temp: 97.9  F (36.6  C)   TempSrc: Oral   Weight: 207 lb 5 oz (94 kg)   Height: 6' (1.829 m)     Physical Exam:  GENERAL: Law is a pleasant, well appearing male, in no acute distress.   HEENT: Sclera white, no nasal discharge   HEART: Regular rate and rhythm, no murmurs  LUNGS: Clear to auscultation bilaterally, unlabored  MSK: Ambulating with crutches    EKG:  Not indicated     Labs:  Recent Results (from the past 24 hour(s))    Hemoglobin    Collection Time: 05/03/19  2:04 PM   Result Value Ref Range    Hemoglobin 16.2 14.0 - 18.0 g/dL      Immunization History   Administered Date(s) Administered     Dtap 03/26/1982       Electronically signed by Diane Junior DO 05/03/19 1:43 PM

## 2021-05-28 NOTE — TELEPHONE ENCOUNTER
New Appointment Needed  What is the reason for the visit:    Pre-Op Appt Request  When is the surgery? :  5.7.2019  Where is the surgery?:   [facility]  Who is the surgeon? :  Unknown Doctor  What type of surgery is being done?: Left knee ACL surgery  Provider Preference: Any available  How soon do you need to be seen?: before surgery  Waitlist offered?: No  Okay to leave a detailed message:  Yes

## 2021-05-30 VITALS — HEIGHT: 72 IN | WEIGHT: 212 LBS | BODY MASS INDEX: 28.71 KG/M2

## 2021-06-01 ENCOUNTER — RECORDS - HEALTHEAST (OUTPATIENT)
Dept: ADMINISTRATIVE | Facility: CLINIC | Age: 46
End: 2021-06-01

## 2021-06-01 VITALS — HEIGHT: 72 IN | BODY MASS INDEX: 29.26 KG/M2 | WEIGHT: 216 LBS

## 2021-06-01 VITALS — HEIGHT: 72 IN | WEIGHT: 216 LBS | BODY MASS INDEX: 29.26 KG/M2

## 2021-06-03 VITALS — HEIGHT: 72 IN | WEIGHT: 207.31 LBS | BODY MASS INDEX: 28.08 KG/M2

## 2021-06-04 VITALS
DIASTOLIC BLOOD PRESSURE: 76 MMHG | HEART RATE: 60 BPM | WEIGHT: 202.38 LBS | TEMPERATURE: 97.6 F | SYSTOLIC BLOOD PRESSURE: 125 MMHG | BODY MASS INDEX: 27.41 KG/M2 | HEIGHT: 72 IN

## 2021-06-09 NOTE — PROGRESS NOTES
SUBJECTIVE:    This is a pleasant 41-year-old male who presents to the clinic for a medication check.  He has a known history of bipolar disorder and has been quite stable while taking Lamictal.  He  states that his mood has been quite stable for long period of time.  He has not had cyclical moods.  He denies any recent manic episodes and has not had a significantly depressed mood.  He has not been feeling anxious.  He denies significant side effects with Lamictal.  His sleep is been fine.  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  There has been no disruption to his daily life.  He does not have any specific stressors or concerns.  He has not had concerning impulsivity or significant mood swings.  He would like to continue with his current medication.      Patient Active Problem List   Diagnosis     Bipolar Disorder       No current outpatient prescriptions on file prior to visit.     No current facility-administered medications on file prior to visit.        Allergies   Allergen Reactions     Venom-Honey Bee Anaphylaxis            A 12 point comprehensive review of systems was negative except as noted.      OBJECTIVE:     Vitals:    04/04/17 1316   BP: 120/66   Pulse: 60   Resp: 16   Temp: 98.2  F (36.8  C)       General: Alert, not acutely distressed   Head:  Atraumatic, normocephalic  Neuro:  CN II-XII appear intact        Laboratory Results:    Results for orders placed or performed in visit on 04/04/17   Comprehensive Metabolic Panel   Result Value Ref Range    Sodium 137 136 - 145 mmol/L    Potassium 4.9 3.5 - 5.0 mmol/L    Chloride 100 98 - 107 mmol/L    CO2 27 22 - 31 mmol/L    Anion Gap, Calculation 10 5 - 18 mmol/L    Glucose 72 70 - 125 mg/dL    BUN 19 8 - 22 mg/dL    Creatinine 1.14 0.70 - 1.30 mg/dL    GFR MDRD Af Amer >60 >60 mL/min/1.73m2    GFR MDRD Non Af Amer >60 >60 mL/min/1.73m2    Bilirubin, Total 1.6 (H) 0.0 - 1.0 mg/dL    Calcium 9.9 8.5 - 10.5 mg/dL    Protein, Total  7.3 6.0 - 8.0 g/dL    Albumin 4.4 3.5 - 5.0 g/dL    Alkaline Phosphatase 67 45 - 120 U/L    AST 29 0 - 40 U/L    ALT 28 0 - 45 U/L   HM2(CBC w/o Differential)   Result Value Ref Range    WBC 7.9 4.0 - 11.0 thou/uL    RBC 5.23 4.40 - 6.20 mill/uL    Hemoglobin 16.1 14.0 - 18.0 g/dL    Hematocrit 47.5 40.0 - 54.0 %    MCV 91 80 - 100 fL    MCH 30.7 27.0 - 34.0 pg    MCHC 33.8 32.0 - 36.0 g/dL    RDW 10.6 (L) 11.0 - 14.5 %    Platelets 234 140 - 440 thou/uL    MPV 7.3 7.0 - 10.0 fL         ASSESSMENT AND PLAN:    1. Bipolar disorder currently stable,     Reviewed his current medication including side effects  Check laboratory testing including conference of metabolic panel and hemogram with platelets  - Comprehensive Metabolic Panel  - HM2(CBC w/o Differential)  - lamoTRIgine (LAMICTAL) 100 MG tablet; Take 1 tablet (100 mg total) by mouth daily.  Dispense: 90 tablet; Refill: 3  He will continue the current medication (Lamictal).  Recommend monitoring for any concerning changes in mood  Follow-up in 1 year or sooner as needed.    15 minutes were spent with the patient and greater than 50% of the time was spent in face to face counseling and coordination of care            Connor Cronin MD      This note has been dictated and transcribed using voice recognition software.  Any grammatical or contextual distortions are unintentional and inherent to the software.

## 2021-06-10 NOTE — TELEPHONE ENCOUNTER
Called pt and LMTCB.    JM- Ok to schedule a Face 2 Face Pre-op on Thursday with you? Please advise. Thank you.

## 2021-06-10 NOTE — TELEPHONE ENCOUNTER
Please call and let patient know that I may have some Friday slots but need to verify. Please tell him I can work him in if Thursday or Friday will work. Please send message back to me.

## 2021-06-10 NOTE — PROGRESS NOTES
[facility]  [address] [facility] [address]  Dept: [phone]  Dept Fax: [phone]  Primary Provider: Alyx Caraballo MD  Pre-op Performing Provider: ALYX CARABALLO    PREOPERATIVE EVALUATION:  Today's date: 8/21/2020    Law Guadalupe is a 44 y.o. male who presents for a preoperative evaluation.    Surgical Information:  Surgery Details 8/21/2020   Surgery/Procedure: Right shoulder rotater cuff   Surgery Location: [facility]   Surgeon: Dr. Cruz   Surgery Date: 8/24/20   Time of Surgery: 6:15 am   Where patient plans to recover: at home with family     Fax number for surgical facility: [facility] fax: [phone]  Type of Anesthesia Anticipated: General    Subjective     HPI related to upcoming procedure: This is a pleasant 44-year-old male who presents to the clinic for preoperative evaluation.  He has a known rotator cuff surgery and injured his right shoulder when he lost his balance and fell while chasing a chicken.  He has had pain and has had an inability to raise his arm above his head.  He does have a history of a previous rotator cuff surgery.  He followed up with an orthopedic surgeon and his MRI showed a rotator cuff tear.  That result is not currently available.  He therefore is a candidate for surgery.    Medical history is otherwise notable for previous low  back surgery for a disc herniation.  He is also had an ACL repair of his left knee.    Medical history is otherwise notable for remote bipolar disorder.  His mood is been stable and does not require any medications.    He has tolerated anesthesia previously and has no other ongoing health concerns.  Is not had any respiratory symptoms of concern.      Preop Questions 8/21/2020   Have you ever had a heart attack or stroke? No   Have you ever had surgery on your heart or blood vessels, such as a stent placement, a coronary artery bypass, or surgery on an artery in  your head, neck, heart, or legs? No   Do you have chest pain with activity? No   Do you have a history of  heart failure? No   Do you currently have a cold, bronchitis or symptoms of other infection? No   Do you have a cough, shortness of breath, or wheezing? No   Do you or anyone in your family have previous history of blood clots? No   Do you or does anyone in your family have a serious bleeding problem such as prolonged bleeding following surgeries or cuts? No   Have you ever had problems with anemia or been told to take iron pills? No   Have you had any abnormal blood loss such as black, tarry or bloody stools? No   Have you ever had a blood transfusion? No   Are you willing to have a blood transfusion if it is medically needed before, during, or after your surgery? Yes   Have you or any of your relatives ever had problems with anesthesia? No   Do you have sleep apnea, excessive snoring or daytime drowsiness? No   Do you have any artifical heart valves or other implanted medical devices like a pacemaker, defibrillator, or continuous glucose monitor? No   Do you have artificial joints? No   Are you allergic to latex? No         Patient does not have a Health Care Directive or Living Will:   No advanced directive on file    [place] ([place] reviewed:  not reviewed/not due - last done on No recent medications  n  See problem list for active medical problems.  Problems all longstanding and stable, except as noted/documented.  See ROS for pertinent symptoms related to these conditions.      Review of Systems  Constitutional, neuro, ENT, endocrine, pulmonary, cardiac, gastrointestinal, genitourinary, musculoskeletal, integument and psychiatric systems are negative, except as otherwise noted.      Patient Active Problem List    Diagnosis Date Noted     Lipoma of scalp 04/10/2018     Bipolar Disorder      Past Medical History:   Diagnosis Date     Bipolar disorder (H)      Past Surgical History:    Procedure Laterality Date     ANKLE SURGERY Right      KNEE SURGERY       NECK SURGERY       GA EXCISE EXCESS SKIN TISSUE,OTHER Right 4/16/2018    Procedure: EXCISION, LIPOMA of Scalp;  Surgeon: Tiffanie Sanz MD;  Location: [facility];  Service: General     SHOULDER SURGERY Right      Current Outpatient Medications   Medication Sig Dispense Refill     ibuprofen (ADVIL,MOTRIN) 200 MG tablet Take 200 mg by mouth every 6 (six) hours as needed for pain.       No current facility-administered medications for this visit.        Allergies   Allergen Reactions     Venom-Honey Bee Anaphylaxis       Social History     Tobacco Use     Smoking status: Never Smoker     Smokeless tobacco: Never Used   Substance Use Topics     Alcohol use: Yes     Comment: Rare      Family History   Problem Relation Age of Onset     Cancer Mother         thyroid cancer     No Medical Problems Father      Social History     Substance and Sexual Activity   Drug Use No           Objective   /76   Pulse 60   Temp 97.6  F (36.4  C) (Oral)   Ht 6' (1.829 m)   Wt 202 lb 6 oz (91.8 kg)   BMI 27.45 kg/m    Physical Exam    GENERAL APPEARANCE: healthy, alert and no distress     EYES: EOMI,  PERRL     HENT: ear canals and TM's normal and nose and mouth without ulcers or lesions     NECK: no adenopathy, no asymmetry, masses, or scars and thyroid normal to palpation     RESP: lungs clear to auscultation - no rales, rhonchi or wheezes     CV: regular rates and rhythm, normal S1 S2, no S3 or S4 and no murmur, click or rub     ABDOMEN:  soft, nontender, no HSM or masses and bowel sounds normal     MS: extremities normal- no gross deformities noted, no evidence of inflammation in joints, FROM in all extremities.     SKIN: no suspicious lesions or rashes     NEURO: Normal strength and tone, sensory exam grossly normal, mentation intact and speech normal     PSYCH: mentation appears normal. and affect normal/bright     LYMPHATICS: No cervical  adenopathy    Recent Labs   Lab Test 05/03/19  1404   HGB 16.2        PRE-OP Diagnostics:   No labs were ordered during this visit.  No EKG required for low risk surgery (cataract, skin procedure, breast biopsy, etc).           Assessment & Plan     Preoperative examination  Right rotator cuff tear  History of previous right rotator cuff repair    Patient is approved for surgery as noted    COVID testing per the surgical team    He will hold over-the-counter supplements including NSAIDs and aspirin as well as other supplements prior to surgery  Follow-up as recommended by orthopedic surgery    The proposed surgical procedure is considered LOW risk.    REVISED CARDIAC RISK INDEX   The patient has the following serious cardiovascular risks for perioperative complications:  No serious cardiac risks = 0 points    INTERPRETATION: 0 points: Class I (very low risk - 0.4% complication rate)      ICD-10-CM    1. Preoperative examination  Z01.818    2. Traumatic tear of right rotator cuff, unspecified tear extent, subsequent encounter  S46.011D        The patient has the following additional risks and recommendations for perioperative complications:     - No identified additional risk factors other than previously addressed     MEDICATION INSTRUCTIONS:  Patient is on no chronic medications    RECOMMENDATION:  APPROVAL GIVEN to proceed with proposed procedure, without further diagnostic evaluation.    No follow-ups on file.    Signed Electronically by: Connor Cronin MD    Copy of this evaluation report is provided to requesting physician.    [facility] Guidelines    Revised Cardiac Risk Index

## 2021-06-10 NOTE — TELEPHONE ENCOUNTER
New Appointment Needed  What is the reason for the visit:    Pre-Op Appt Request  When is the surgery? :  8.24.20  Where is the surgery?:   [place]   Who is the surgeon? :    What type of surgery is being done?: wrist surgery  Provider Preference: PCP only  How soon do you need to be seen?: tomorrow  Waitlist offered?: Yes  Okay to leave a detailed message:  Yes

## 2021-06-10 NOTE — TELEPHONE ENCOUNTER
Who is calling:  Patient   Reason for Call: The patient is returning a call to PCP for scheduling his pre op for his upcoming shoulder surgery. Please advise at the number provided as soon as possible.    Okay to leave a detailed message: Yes

## 2021-06-10 NOTE — TELEPHONE ENCOUNTER
Pt is scheduled for a pre-op on Friday.      Pt states he was told by the surgeon that he needs a COVID test within 72 hours of surgery.  Should he have it done prior to his appt on Friday or after?  And do you order that?

## 2021-06-10 NOTE — TELEPHONE ENCOUNTER
That is ordered and managed by surgery. You can check the work flows as there may be an RN team that manages this.

## 2021-06-17 NOTE — PROGRESS NOTES
Preoperative Exam    Scheduled Procedure: Herniated disc in lumbar spine  Surgery Date:  4/11/8  Surgery Location: [facility], fax [phone]    Surgeon:  Dr. Marvin    Assessment/Plan:     1. Preoperative examination  2. Lumbar disc herniation    Hemoglobin is pending  Recommend holding NSAIDs and over-the-counter supplements prior to surgery  Patient is approved for surgery  Follow-up as recommended by spine surgery    Surgical Procedure Risk: Low (reported cardiac risk generally < 1%)  Have you had prior anesthesia?: Yes  Have you or any family members had a previous anesthesia reaction:  No  Do you or any family members have a history of a clotting or bleeding disorder?: No  Cardiac Risk Assessment: no increased risk for major cardiac complications    Patient approved for surgery with general or local anesthesia.    Please Note:    Functional Status: Independent  Patient plans to recover at home with family.     3.  Scalp mass, possible lipoma versus other etiology    Recommend further evaluation.  Patient preference is to have this removed and he will be referred to surgery     Subjective:      Law Guadalupe is a 42 y.o. male who presents for a preoperative consultation.  He has a history of ongoing low back pain which was recently aggravated by an injury when attempting to lift something.  Per his report he has a lumbar disc herniation.  He has had symptoms radiating to his right buttock and down the right posterior thigh area and sometimes to his right calf.  He has tried an epidural steroid injection without improvement.  As result, he is a candidate for what sounds like a discectomy.  Please note that records are not currently available.    Medical history is otherwise notable for bipolar disorder which has been stable.  He has weaned off of all medications.  He has not experienced any significant mood fluctuations and denies any concerns or increased anxiety.    He  has tolerated anesthesia previously without difficulty.  He denies recent respiratory infection.  Review of systems is negative for headache, dizziness, chest pain, palpitations, or bowel concerns.    However, he does have a mass on his scalp in the occipital region.  This is been present for years and has grown.  He would like to have this removed as his protective pads to cause pressure in that area.    All other systems reviewed and are negative, other than those listed in the HPI.    Pertinent History  Do you have difficulty breathing or chest pain after walking up a flight of stairs: No  History of obstructive sleep apnea: No  Steroid use in the last 6 months: Yes  Frequent Aspirin/NSAID use: Yes: As needed  Prior Blood Transfusion: No  Prior Blood Transfusion Reaction: No  If for some reason prior to, during or after the procedure, if it is medically indicated, would you be willing to have a blood transfusion?:  There is no transfusion refusal.    Current Outpatient Prescriptions   Medication Sig Dispense Refill     ibuprofen (ADVIL,MOTRIN) 200 MG tablet Take 200 mg by mouth every 6 (six) hours as needed for pain.       No current facility-administered medications for this visit.         Allergies   Allergen Reactions     Venom-Honey Bee Anaphylaxis       Patient Active Problem List   Diagnosis     Bipolar Disorder       Past Medical History:   Diagnosis Date     Bipolar disorder        Past Surgical History:   Procedure Laterality Date     KNEE SURGERY       NECK SURGERY         Social History     Social History     Marital status: Single     Spouse name: N/A     Number of children: N/A     Years of education: N/A     Occupational History     Not on file.     Social History Main Topics     Smoking status: Never Smoker     Smokeless tobacco: Never Used     Alcohol use Not on file     Drug use: Not on file     Sexual activity: Not on file     Other Topics Concern     Not on file     Social History Narrative  "      Patient Care Team:  Connor Cronin MD as PCP - General (Family Medicine)          Objective:     Vitals:    04/09/18 1008   BP: 126/60   Pulse: 60   Resp: 16   Temp: 98.1  F (36.7  C)   TempSrc: Oral   Weight: 216 lb (98 kg)   Height: 5' 11.5\" (1.816 m)         Physical Exam:  General appearance: alert, appears stated age and cooperative  Head: Normocephalic, atraumatic  There is a palpable, soft, fluctuant mass in the right occipital region  Eyes: conjunctivae/corneas clear. PERRL, EOM's intact.   Ears: normal TM's and external ear canals both ears  Nose: Nares normal. Septum midline. Mucosa normal. No drainage or sinus tenderness.  Throat: lips, mucosa, and tongue normal; teeth and gums normal  Neck: no adenopathy, supple, symmetrical  Lungs: clear to auscultation bilaterally  Heart: regular rate and rhythm, S1, S2 normal, no murmur, click, rub or gallop  Abdomen: soft, non-tender  Extremities: extremities normal, atraumatic, no cyanosis or edema  Skin: Skin color, texture, turgor normal. No rashes or lesions  Lymph nodes: Cervical nodes normal.  Neurologic: Alert and oriented X 3. Normal coordination and gait      There are no Patient Instructions on file for this visit.    EKG:  Not done    Labs:  No results found for this or any previous visit (from the past 24 hour(s)).      There is no immunization history on file for this patient.        Electronically signed by Connor Cronin MD 04/09/18 10:10 AM  "

## 2021-06-17 NOTE — PROGRESS NOTES
This is a 42 y.o. male who I'm asked to see by Connor Cronin MD for evaluation of a lump on his scalp.  The patient has noticed it there for about several years.  It is not painful.  It is just slowly getting larger and more bothersome.  He notices it now all the time.    See chart review for PMH, Med list, allergies, FH and SH.  Patient Active Problem List   Diagnosis     Bipolar Disorder     Lipoma of scalp     Medications:  As needed ibuprofen.  Allergies:  Bee stings    Ros is negative for a full 12 point ROS.    Physical Exam:  General: Healthy young gentleman in no acute distress.  Lungs: Clear.  CV: Regular without murmur.  Skin: Fairly large soft well-defined mass in the right posterior scalp.  This measures at least 4-5 cm in diameter.    Imp: Scalp lipoma.  This could be a sebaceous cyst but it feels too soft to be that.  It is too large to do in the office.  I feel this needs to be done in the surgery center.    Plan: Plan excision.  Risk and benefits were explained.  The is typically something we can do as an outpatient under local anesthetic with IV sedation.  Appropriate questions were asked and answered.

## 2021-06-17 NOTE — ANESTHESIA PREPROCEDURE EVALUATION
Anesthesia Evaluation      Patient summary reviewed   No history of anesthetic complications     Airway   Mallampati: II  Neck ROM: full   Pulmonary - normal exam    breath sounds clear to auscultation  (-) sleep apnea, not a smoker                         Cardiovascular - negative ROS  Exercise tolerance: > or = 4 METS  (-) murmur  Rhythm: regular  Rate: normal,    no murmur      Neuro/Psych      Comments: Bipolar    Endo/Other - negative ROS      GI/Hepatic/Renal - negative ROS   (-) GERD          Dental - normal exam                        Anesthesia Plan  Planned anesthetic: MAC  Fire risk precautions  ASA 2   Induction: intravenous   Anesthetic plan and risks discussed with: patient  Anesthesia plan special considerations: antiemetics,   Post-op plan: routine recovery

## 2021-06-17 NOTE — ANESTHESIA POSTPROCEDURE EVALUATION
Patient: Law A Guadalupe  EXCISION, LIPOMA of Scalp  Anesthesia type: MAC    Patient location: Phase II Recovery  Last vitals:   Vitals:    04/16/18 1356   BP: 136/72   Pulse: 62   Resp: 16   Temp:    SpO2: 97%     Post vital signs: stable  Level of consciousness: awake and responds to simple questions  Post-anesthesia pain: pain controlled  Post-anesthesia nausea and vomiting: no  Pulmonary: unassisted, return to baseline  Cardiovascular: stable and blood pressure at baseline  Hydration: adequate  Anesthetic events: no    QCDR Measures:  ASA# 11 - Katherine-op Cardiac Arrest: ASA11B - Patient did NOT experience unanticipated cardiac arrest  ASA# 12 - Katherine-op Mortality Rate: ASA12B - Patient did NOT die  ASA# 13 - PACU Re-Intubation Rate: NA - No ETT / LMA used for case  ASA# 10 - Composite Anes Safety: ASA10A - No serious adverse event    Additional Notes:

## 2021-06-17 NOTE — ANESTHESIA CARE TRANSFER NOTE
Last vitals:   Vitals:    04/16/18 1323   BP: 130/66   Pulse: 65   Resp: 16   Temp: 36.8  C (98.2  F)   SpO2: 93%     Patient's level of consciousness is awake  Spontaneous respirations: yes  Maintains airway independently: yes  Dentition unchanged: yes  Oropharynx: oropharynx clear of all foreign objects    QCDR Measures:  ASA# 20 - Surgical Safety Checklist: WHO surgical safety checklist completed prior to induction  PQRS# 430 - Adult PONV Prevention: 4558F - Pt received => 2 anti-emetic agents (different classes) preop & intraop  ASA# 8 - Peds PONV Prevention: NA - Not pediatric patient, not GA or 2 or more risk factors NOT present  PQRS# 424 - Katherine-op Temp Management: 4559F - At least one body temp DOCUMENTED => 35.5C or 95.9F within required timeframe  PQRS# 426 - PACU Transfer Protocol: - Transfer of care checklist used  ASA# 14 - Acute Post-op Pain: ASA14B - Patient did NOT experience pain >= 7 out of 10